# Patient Record
Sex: MALE | Race: WHITE | Employment: FULL TIME | URBAN - METROPOLITAN AREA
[De-identification: names, ages, dates, MRNs, and addresses within clinical notes are randomized per-mention and may not be internally consistent; named-entity substitution may affect disease eponyms.]

---

## 2019-02-08 ENCOUNTER — HOSPITAL ENCOUNTER (OUTPATIENT)
Age: 34
Discharge: HOME | End: 2019-02-10
Payer: COMMERCIAL

## 2019-02-08 PROCEDURE — 87075 CULTR BACTERIA EXCEPT BLOOD: CPT

## 2019-02-08 PROCEDURE — 87070 CULTURE OTHR SPECIMN AEROBIC: CPT

## 2019-02-08 PROCEDURE — 87186 SC STD MICRODIL/AGAR DIL: CPT

## 2019-02-08 PROCEDURE — 87205 SMEAR GRAM STAIN: CPT

## 2019-02-09 LAB — GRAM STAIN ORDERABLE: NORMAL

## 2019-02-10 LAB
ANAEROBIC CULTURE: NORMAL
ORGANISM: ABNORMAL
WOUND/ABSCESS: ABNORMAL
WOUND/ABSCESS: ABNORMAL

## 2022-08-15 ENCOUNTER — HOSPITAL ENCOUNTER (EMERGENCY)
Age: 37
Discharge: HOME | End: 2022-08-15
Attending: EMERGENCY MEDICINE
Payer: COMMERCIAL

## 2022-08-15 ENCOUNTER — APPOINTMENT (OUTPATIENT)
Dept: CT IMAGING | Age: 37
End: 2022-08-15
Payer: COMMERCIAL

## 2022-08-15 VITALS
WEIGHT: 250 LBS | HEIGHT: 73 IN | TEMPERATURE: 97.6 F | DIASTOLIC BLOOD PRESSURE: 79 MMHG | HEART RATE: 85 BPM | SYSTOLIC BLOOD PRESSURE: 134 MMHG | OXYGEN SATURATION: 97 % | BODY MASS INDEX: 33.13 KG/M2 | RESPIRATION RATE: 18 BRPM

## 2022-08-15 DIAGNOSIS — N20.0 KIDNEY STONE: Primary | ICD-10-CM

## 2022-08-15 LAB
ALBUMIN SERPL-MCNC: 4.7 G/DL (ref 3.5–5.2)
ALP BLD-CCNC: 61 U/L (ref 40–129)
ALT SERPL-CCNC: 27 U/L (ref 0–40)
ANION GAP SERPL CALCULATED.3IONS-SCNC: 13 MMOL/L (ref 7–16)
AST SERPL-CCNC: 17 U/L (ref 0–39)
BACTERIA: ABNORMAL /HPF
BASOPHILS ABSOLUTE: 0.04 E9/L (ref 0–0.2)
BASOPHILS RELATIVE PERCENT: 0.5 % (ref 0–2)
BILIRUB SERPL-MCNC: <0.2 MG/DL (ref 0–1.2)
BILIRUBIN URINE: NEGATIVE
BLOOD, URINE: ABNORMAL
BUN BLDV-MCNC: 20 MG/DL (ref 6–20)
CALCIUM SERPL-MCNC: 9.4 MG/DL (ref 8.6–10.2)
CHLORIDE BLD-SCNC: 105 MMOL/L (ref 98–107)
CLARITY: CLEAR
CO2: 24 MMOL/L (ref 22–29)
COLOR: YELLOW
CREAT SERPL-MCNC: 1.3 MG/DL (ref 0.7–1.2)
CRYSTALS, UA: ABNORMAL /HPF
EKG ATRIAL RATE: 85 BPM
EKG P AXIS: 39 DEGREES
EKG P-R INTERVAL: 162 MS
EKG Q-T INTERVAL: 386 MS
EKG QRS DURATION: 102 MS
EKG QTC CALCULATION (BAZETT): 459 MS
EKG R AXIS: 31 DEGREES
EKG T AXIS: 29 DEGREES
EKG VENTRICULAR RATE: 85 BPM
EOSINOPHILS ABSOLUTE: 0.37 E9/L (ref 0.05–0.5)
EOSINOPHILS RELATIVE PERCENT: 4.2 % (ref 0–6)
GFR AFRICAN AMERICAN: >60
GFR NON-AFRICAN AMERICAN: >60 ML/MIN/1.73
GLUCOSE BLD-MCNC: 133 MG/DL (ref 74–99)
GLUCOSE URINE: NEGATIVE MG/DL
HCT VFR BLD CALC: 39.9 % (ref 37–54)
HEMOGLOBIN: 13 G/DL (ref 12.5–16.5)
IMMATURE GRANULOCYTES #: 0.05 E9/L
IMMATURE GRANULOCYTES %: 0.6 % (ref 0–5)
KETONES, URINE: NEGATIVE MG/DL
LEUKOCYTE ESTERASE, URINE: NEGATIVE
LIPASE: 26 U/L (ref 13–60)
LYMPHOCYTES ABSOLUTE: 3.36 E9/L (ref 1.5–4)
LYMPHOCYTES RELATIVE PERCENT: 38.2 % (ref 20–42)
MCH RBC QN AUTO: 28.9 PG (ref 26–35)
MCHC RBC AUTO-ENTMCNC: 32.6 % (ref 32–34.5)
MCV RBC AUTO: 88.7 FL (ref 80–99.9)
MONOCYTES ABSOLUTE: 0.83 E9/L (ref 0.1–0.95)
MONOCYTES RELATIVE PERCENT: 9.4 % (ref 2–12)
NEUTROPHILS ABSOLUTE: 4.15 E9/L (ref 1.8–7.3)
NEUTROPHILS RELATIVE PERCENT: 47.1 % (ref 43–80)
NITRITE, URINE: NEGATIVE
PDW BLD-RTO: 12.8 FL (ref 11.5–15)
PH UA: 5.5 (ref 5–9)
PLATELET # BLD: 276 E9/L (ref 130–450)
PMV BLD AUTO: 9.9 FL (ref 7–12)
POTASSIUM SERPL-SCNC: 3.9 MMOL/L (ref 3.5–5)
PROTEIN UA: NEGATIVE MG/DL
RBC # BLD: 4.5 E12/L (ref 3.8–5.8)
RBC UA: ABNORMAL /HPF (ref 0–2)
SODIUM BLD-SCNC: 142 MMOL/L (ref 132–146)
SPECIFIC GRAVITY UA: >=1.03 (ref 1–1.03)
TOTAL PROTEIN: 7 G/DL (ref 6.4–8.3)
UROBILINOGEN, URINE: 0.2 E.U./DL
WBC # BLD: 8.8 E9/L (ref 4.5–11.5)
WBC UA: ABNORMAL /HPF (ref 0–5)

## 2022-08-15 PROCEDURE — 2580000003 HC RX 258: Performed by: EMERGENCY MEDICINE

## 2022-08-15 PROCEDURE — 81001 URINALYSIS AUTO W/SCOPE: CPT

## 2022-08-15 PROCEDURE — 85025 COMPLETE CBC W/AUTO DIFF WBC: CPT

## 2022-08-15 PROCEDURE — 96375 TX/PRO/DX INJ NEW DRUG ADDON: CPT

## 2022-08-15 PROCEDURE — 83690 ASSAY OF LIPASE: CPT

## 2022-08-15 PROCEDURE — 93005 ELECTROCARDIOGRAM TRACING: CPT | Performed by: EMERGENCY MEDICINE

## 2022-08-15 PROCEDURE — 80053 COMPREHEN METABOLIC PANEL: CPT

## 2022-08-15 PROCEDURE — 99284 EMERGENCY DEPT VISIT MOD MDM: CPT

## 2022-08-15 PROCEDURE — 6360000002 HC RX W HCPCS: Performed by: EMERGENCY MEDICINE

## 2022-08-15 PROCEDURE — 74176 CT ABD & PELVIS W/O CONTRAST: CPT

## 2022-08-15 PROCEDURE — 96374 THER/PROPH/DIAG INJ IV PUSH: CPT

## 2022-08-15 RX ORDER — ONDANSETRON 2 MG/ML
4 INJECTION INTRAMUSCULAR; INTRAVENOUS EVERY 6 HOURS PRN
Status: DISCONTINUED | OUTPATIENT
Start: 2022-08-15 | End: 2022-08-15 | Stop reason: HOSPADM

## 2022-08-15 RX ORDER — 0.9 % SODIUM CHLORIDE 0.9 %
1000 INTRAVENOUS SOLUTION INTRAVENOUS ONCE
Status: COMPLETED | OUTPATIENT
Start: 2022-08-15 | End: 2022-08-15

## 2022-08-15 RX ORDER — DROPERIDOL 2.5 MG/ML
0.62 INJECTION, SOLUTION INTRAMUSCULAR; INTRAVENOUS ONCE
Status: COMPLETED | OUTPATIENT
Start: 2022-08-15 | End: 2022-08-15

## 2022-08-15 RX ORDER — KETOROLAC TROMETHAMINE 30 MG/ML
30 INJECTION, SOLUTION INTRAMUSCULAR; INTRAVENOUS ONCE
Status: COMPLETED | OUTPATIENT
Start: 2022-08-15 | End: 2022-08-15

## 2022-08-15 RX ADMIN — ONDANSETRON HYDROCHLORIDE 4 MG: 2 SOLUTION INTRAMUSCULAR; INTRAVENOUS at 04:00

## 2022-08-15 RX ADMIN — KETOROLAC TROMETHAMINE 30 MG: 30 INJECTION, SOLUTION INTRAMUSCULAR at 03:51

## 2022-08-15 RX ADMIN — SODIUM CHLORIDE 1000 ML: 9 INJECTION, SOLUTION INTRAVENOUS at 03:55

## 2022-08-15 RX ADMIN — DROPERIDOL 0.62 MG: 2.5 INJECTION, SOLUTION INTRAMUSCULAR; INTRAVENOUS at 06:06

## 2022-08-15 ASSESSMENT — PAIN - FUNCTIONAL ASSESSMENT
PAIN_FUNCTIONAL_ASSESSMENT: 0-10
PAIN_FUNCTIONAL_ASSESSMENT: 0-10

## 2022-08-15 ASSESSMENT — PAIN DESCRIPTION - FREQUENCY: FREQUENCY: CONTINUOUS

## 2022-08-15 ASSESSMENT — PAIN DESCRIPTION - DESCRIPTORS: DESCRIPTORS: ACHING;STABBING

## 2022-08-15 ASSESSMENT — PAIN DESCRIPTION - ORIENTATION: ORIENTATION: RIGHT

## 2022-08-15 ASSESSMENT — PAIN SCALES - GENERAL
PAINLEVEL_OUTOF10: 2
PAINLEVEL_OUTOF10: 10
PAINLEVEL_OUTOF10: 10

## 2022-08-15 ASSESSMENT — PAIN DESCRIPTION - LOCATION: LOCATION: ABDOMEN;BACK;FLANK

## 2022-08-15 NOTE — ED PROVIDER NOTES
HPI:  8/15/22, Time: 3:48 AM EDT         Boo Ponce is a 40 y.o. male presenting to the ED for flank pain, beginning hours ago. The complaint has been persistent, moderate in severity, and worsened by nothing. Patient reporting right-sided flank pain going into his right lower abdomen as well as his right testicle. Patient reporting nausea but no vomiting. Patient reporting no chest pain he does report feeling short of breath secondary to the pain. Patient reporting no urinary symptoms. Patient reporting no headache. He reports no cough or fever. ROS:   Pertinent positives and negatives are stated within HPI, all other systems reviewed and are negative.  --------------------------------------------- PAST HISTORY ---------------------------------------------  Past Medical History:  has no past medical history on file. Past Surgical History:  has no past surgical history on file. Social History:  reports that he has never smoked. He has never used smokeless tobacco. He reports that he does not currently use alcohol. Family History: family history is not on file. The patients home medications have been reviewed. Allergies: Patient has no known allergies. ---------------------------------------------------PHYSICAL EXAM--------------------------------------    Constitutional/General: Alert and oriented x3, mild to moderate distress  Head: Normocephalic and atraumatic  Eyes: PERRL, EOMI  Mouth: Oropharynx clear, handling secretions, no trismus  Neck: Supple, full ROM, non tender to palpation in the midline, no stridor, no crepitus, no meningeal signs  Pulmonary: Lungs clear to auscultation bilaterally, no wheezes, rales, or rhonchi. Not in respiratory distress  Cardiovascular:  Regular rate. Regular rhythm. No murmurs, gallops, or rubs. 2+ distal pulses  Chest: no chest wall tenderness  Abdomen: Soft. Tender right lower abdomen as well as right flank region non distended. +BS.   No rebound, guarding, or rigidity. No pulsatile masses appreciated. Patient having no testicular tenderness on exam.  There is no swelling. Musculoskeletal: Moves all extremities x 4. Warm and well perfused, no clubbing, cyanosis, or edema. Capillary refill <3 seconds  Skin: warm and dry. No rashes. Neurologic: GCS 15, CN 2-12 grossly intact, no focal deficits, symmetric strength 5/5 in the upper and lower extremities bilaterally  Psych: Normal Affect    -------------------------------------------------- RESULTS -------------------------------------------------  I have personally reviewed all laboratory and imaging results for this patient. Results are listed below.      LABS:  Results for orders placed or performed during the hospital encounter of 08/15/22   CBC with Auto Differential   Result Value Ref Range    WBC 8.8 4.5 - 11.5 E9/L    RBC 4.50 3.80 - 5.80 E12/L    Hemoglobin 13.0 12.5 - 16.5 g/dL    Hematocrit 39.9 37.0 - 54.0 %    MCV 88.7 80.0 - 99.9 fL    MCH 28.9 26.0 - 35.0 pg    MCHC 32.6 32.0 - 34.5 %    RDW 12.8 11.5 - 15.0 fL    Platelets 778 381 - 767 E9/L    MPV 9.9 7.0 - 12.0 fL    Neutrophils % 47.1 43.0 - 80.0 %    Immature Granulocytes % 0.6 0.0 - 5.0 %    Lymphocytes % 38.2 20.0 - 42.0 %    Monocytes % 9.4 2.0 - 12.0 %    Eosinophils % 4.2 0.0 - 6.0 %    Basophils % 0.5 0.0 - 2.0 %    Neutrophils Absolute 4.15 1.80 - 7.30 E9/L    Immature Granulocytes # 0.05 E9/L    Lymphocytes Absolute 3.36 1.50 - 4.00 E9/L    Monocytes Absolute 0.83 0.10 - 0.95 E9/L    Eosinophils Absolute 0.37 0.05 - 0.50 E9/L    Basophils Absolute 0.04 0.00 - 0.20 E9/L   Comprehensive Metabolic Panel   Result Value Ref Range    Sodium 142 132 - 146 mmol/L    Potassium 3.9 3.5 - 5.0 mmol/L    Chloride 105 98 - 107 mmol/L    CO2 24 22 - 29 mmol/L    Anion Gap 13 7 - 16 mmol/L    Glucose 133 (H) 74 - 99 mg/dL    BUN 20 6 - 20 mg/dL    Creatinine 1.3 (H) 0.7 - 1.2 mg/dL    GFR Non-African American >60 >=60 mL/min/1.73    GFR African American >60     Calcium 9.4 8.6 - 10.2 mg/dL    Total Protein 7.0 6.4 - 8.3 g/dL    Albumin 4.7 3.5 - 5.2 g/dL    Total Bilirubin <0.2 0.0 - 1.2 mg/dL    Alkaline Phosphatase 61 40 - 129 U/L    ALT 27 0 - 40 U/L    AST 17 0 - 39 U/L   Lipase   Result Value Ref Range    Lipase 26 13 - 60 U/L   Urinalysis   Result Value Ref Range    Color, UA Yellow Straw/Yellow    Clarity, UA Clear Clear    Glucose, Ur Negative Negative mg/dL    Bilirubin Urine Negative Negative    Ketones, Urine Negative Negative mg/dL    Specific Gravity, UA >=1.030 1.005 - 1.030    Blood, Urine MODERATE (A) Negative    pH, UA 5.5 5.0 - 9.0    Protein, UA Negative Negative mg/dL    Urobilinogen, Urine 0.2 <2.0 E.U./dL    Nitrite, Urine Negative Negative    Leukocyte Esterase, Urine Negative Negative   Microscopic Urinalysis   Result Value Ref Range    WBC, UA NONE 0 - 5 /HPF    RBC, UA 5-10 (A) 0 - 2 /HPF    Bacteria, UA NONE SEEN None Seen /HPF    Crystals, UA Rare (A) None Seen /HPF   EKG 12 Lead   Result Value Ref Range    Ventricular Rate 85 BPM    Atrial Rate 85 BPM    P-R Interval 162 ms    QRS Duration 102 ms    Q-T Interval 386 ms    QTc Calculation (Bazett) 459 ms    P Axis 39 degrees    R Axis 31 degrees    T Axis 29 degrees       RADIOLOGY:  Interpreted by Radiologist.  CT ABDOMEN PELVIS WO CONTRAST Additional Contrast? None   Preliminary Result   Small amount of scattered gas with multiple small fluid levels in small   bowel. Finding is nonspecific but acute enteritis not excluded. Otherwise, definable acute process in the abdomen or in the pelvis. Normal   appendix visualized. Mild generalized fatty infiltration in the liver with mild hepatomegaly. EKG:  This EKG is signed and interpreted by me.     Rate: 85  Rhythm: Sinus  Interpretation: no acute changes  Comparison: no previous EKG available    ------------------------- NURSING NOTES AND VITALS REVIEWED ---------------------------   The nursing notes within the ED encounter and vital signs as below have been reviewed by myself. /79   Pulse 85   Temp 97.6 °F (36.4 °C) (Oral)   Resp 18   Ht 6' 1\" (1.854 m)   Wt 250 lb (113.4 kg)   SpO2 97%   BMI 32.98 kg/m²   Oxygen Saturation Interpretation: Normal    The patients available past medical records and past encounters were reviewed. ------------------------------ ED COURSE/MEDICAL DECISION MAKING----------------------  Medications   ondansetron (ZOFRAN) injection 4 mg (4 mg IntraVENous Given 8/15/22 0400)   0.9 % sodium chloride bolus (0 mLs IntraVENous Stopped 8/15/22 0545)   ketorolac (TORADOL) injection 30 mg (30 mg IntraVENous Given 8/15/22 0351)   droperidol (INAPSINE) injection 0.625 mg (0.625 mg IntraVENous Given 8/15/22 0606)             Medical Decision Making:    Patient presenting here because of flank pain going down into his right groin into his testicle that started hours prior arrival.  Patient reporting nausea but no vomiting. Patient reporting no fever no chills. Patient reporting some mild shortness of breath secondary to pain. Patient reporting no leg pain or swelling. Patient on exam was in mild to moderate distress patient was medicated Toradol and as well as Zofran. Patient did have significant relief with the pain medication. Patient labs and CT noted reviewed. Patient does have noted blood in microscopically in his urine. Patient CT shows no signs of kidney stones there is no obstruction. Patient does have uric acid noted on urinalysis. Patient did become nauseated again. Patient was medicated with droperidol. Patient significantly improved on recheck abdomen soft nontender he is in no distress. Patient has had no episodes of emesis here patient was made aware of findings and suspected kidney stone although there is no visualized kidney stone on CT.   Patient will be followed by his PCP is to follow-up with urology is to return if symptoms worsen or persist patient and family member are comfortable to plan on recheck he is nontoxic-appearing and having no pain    Re-Evaluations:             Patient rechecked multiple times here in the emergency room. Patient seemingly improved after being medicated with Toradol and Zofran he did have an episode where he was having some increased nausea and some mild upper abdominal pain was 3 medicated with droperidol has complete relief of pain. Patient was made aware of findings and plan. And CT results. Patient was made aware that I do believe he has a had a kidney stone or he may have passed that although was not visualized and there is no hydronephrosis. Patient to follow-up with his PCP he is to return if symptoms worsen or persist his abdomen is soft and nontender on recheck. He is having no testicular pain on recheck      Consultations:                 Critical Care: This patient's ED course included: a personal history and physicial eaxmination    This patient has been closely monitored during their ED course. Counseling: The emergency provider has spoken with the patient and discussed todays results, in addition to providing specific details for the plan of care and counseling regarding the diagnosis and prognosis. Questions are answered at this time and they are agreeable with the plan.       --------------------------------- IMPRESSION AND DISPOSITION ---------------------------------    IMPRESSION  1. Kidney stone        DISPOSITION  Disposition: Discharge to home  Patient condition is stable        NOTE: This report was transcribed using voice recognition software.  Every effort was made to ensure accuracy; however, inadvertent computerized transcription errors may be present          Heath Vilchis MD  08/15/22 5338

## 2025-01-09 ENCOUNTER — HOSPITAL ENCOUNTER (OUTPATIENT)
Age: 40
Discharge: HOME | End: 2025-01-11
Payer: COMMERCIAL

## 2025-01-09 ENCOUNTER — HOSPITAL ENCOUNTER (OUTPATIENT)
Dept: GENERAL RADIOLOGY | Age: 40
Discharge: HOME | End: 2025-01-11
Payer: COMMERCIAL

## 2025-01-09 DIAGNOSIS — M10.9 ARTICULAR GOUT: ICD-10-CM

## 2025-01-09 PROCEDURE — 73630 X-RAY EXAM OF FOOT: CPT

## 2025-06-13 ENCOUNTER — TELEPHONE (OUTPATIENT)
Dept: CARDIOLOGY | Age: 40
End: 2025-06-13

## 2025-07-01 ENCOUNTER — HOSPITAL ENCOUNTER (OUTPATIENT)
Dept: CARDIOLOGY | Age: 40
Discharge: HOME | End: 2025-07-03
Payer: COMMERCIAL

## 2025-07-01 VITALS — HEIGHT: 73 IN | WEIGHT: 250 LBS | BODY MASS INDEX: 33.13 KG/M2

## 2025-07-01 DIAGNOSIS — R00.0 TACHYCARDIA: ICD-10-CM

## 2025-07-01 LAB
ECHO AO ASC DIAM: 2.9 CM
ECHO AO ASCENDING AORTA INDEX: 1.22 CM/M2
ECHO AO SINUS VALSALVA DIAM: 3.2 CM
ECHO AO SINUS VALSALVA INDEX: 1.35 CM/M2
ECHO AV AREA PEAK VELOCITY: 2.5 CM2
ECHO AV AREA VTI: 2.8 CM2
ECHO AV AREA/BSA PEAK VELOCITY: 1.1 CM2/M2
ECHO AV AREA/BSA VTI: 1.2 CM2/M2
ECHO AV CUSP MM: 2.6 CM
ECHO AV MEAN GRADIENT: 3 MMHG
ECHO AV MEAN VELOCITY: 0.9 M/S
ECHO AV PEAK GRADIENT: 6 MMHG
ECHO AV PEAK VELOCITY: 1.3 M/S
ECHO AV VELOCITY RATIO: 0.69
ECHO AV VTI: 22.3 CM
ECHO BSA: 2.42 M2
ECHO EST RA PRESSURE: 3 MMHG
ECHO LA DIAMETER INDEX: 1.6 CM/M2
ECHO LA DIAMETER: 3.8 CM
ECHO LA VOL A-L A2C: 54 ML (ref 18–58)
ECHO LA VOL A-L A4C: 54 ML (ref 18–58)
ECHO LA VOL MOD A2C: 52 ML (ref 18–58)
ECHO LA VOL MOD A4C: 49 ML (ref 18–58)
ECHO LA VOLUME AREA LENGTH: 58 ML
ECHO LA VOLUME INDEX A-L A2C: 23 ML/M2 (ref 16–34)
ECHO LA VOLUME INDEX A-L A4C: 23 ML/M2 (ref 16–34)
ECHO LA VOLUME INDEX AREA LENGTH: 24 ML/M2 (ref 16–34)
ECHO LA VOLUME INDEX MOD A2C: 22 ML/M2 (ref 16–34)
ECHO LA VOLUME INDEX MOD A4C: 21 ML/M2 (ref 16–34)
ECHO LV EF PHYSICIAN: 60 %
ECHO LV FRACTIONAL SHORTENING: 34 % (ref 28–44)
ECHO LV INTERNAL DIMENSION DIASTOLE INDEX: 1.86 CM/M2
ECHO LV INTERNAL DIMENSION DIASTOLIC: 4.4 CM (ref 4.2–5.9)
ECHO LV INTERNAL DIMENSION SYSTOLIC INDEX: 1.22 CM/M2
ECHO LV INTERNAL DIMENSION SYSTOLIC: 2.9 CM
ECHO LV ISOVOLUMETRIC RELAXATION TIME (IVRT): 93.4 MS
ECHO LV IVSD: 1.2 CM (ref 0.6–1)
ECHO LV IVSS: 1.6 CM
ECHO LV MASS 2D: 191.3 G (ref 88–224)
ECHO LV MASS INDEX 2D: 80.7 G/M2 (ref 49–115)
ECHO LV POSTERIOR WALL DIASTOLIC: 1.2 CM (ref 0.6–1)
ECHO LV POSTERIOR WALL SYSTOLIC: 1.3 CM
ECHO LV RELATIVE WALL THICKNESS RATIO: 0.55
ECHO LVOT AREA: 3.1 CM2
ECHO LVOT AV VTI INDEX: 0.86
ECHO LVOT DIAM: 2 CM
ECHO LVOT MEAN GRADIENT: 2 MMHG
ECHO LVOT PEAK GRADIENT: 4 MMHG
ECHO LVOT PEAK VELOCITY: 0.9 M/S
ECHO LVOT STROKE VOLUME INDEX: 25.3 ML/M2
ECHO LVOT SV: 60 ML
ECHO LVOT VTI: 19.1 CM
ECHO MV "A" WAVE DURATION: 145.3 MSEC
ECHO MV A VELOCITY: 0.58 M/S
ECHO MV AREA PHT: 2.9 CM2
ECHO MV AREA VTI: 2.8 CM2
ECHO MV E DECELERATION TIME (DT): 204.6 MS
ECHO MV E VELOCITY: 0.87 M/S
ECHO MV E/A RATIO: 1.5
ECHO MV LVOT VTI INDEX: 1.14
ECHO MV MAX VELOCITY: 0.9 M/S
ECHO MV MEAN GRADIENT: 2 MMHG
ECHO MV MEAN VELOCITY: 0.6 M/S
ECHO MV PEAK GRADIENT: 3 MMHG
ECHO MV PRESSURE HALF TIME (PHT): 76.2 MS
ECHO MV VTI: 21.8 CM
ECHO PV MAX VELOCITY: 1 M/S
ECHO PV MEAN GRADIENT: 2 MMHG
ECHO PV MEAN VELOCITY: 0.7 M/S
ECHO PV PEAK GRADIENT: 4 MMHG
ECHO PV VTI: 18.6 CM
ECHO PVEIN A DURATION: 103.8 MS
ECHO PVEIN A VELOCITY: 0.3 M/S
ECHO PVEIN PEAK D VELOCITY: 0.4 M/S
ECHO PVEIN PEAK S VELOCITY: 0.6 M/S
ECHO PVEIN S/D RATIO: 1.5
ECHO RIGHT VENTRICULAR SYSTOLIC PRESSURE (RVSP): 22 MMHG
ECHO RV INTERNAL DIMENSION: 2.5 CM
ECHO RV TAPSE: 2.8 CM (ref 1.7–?)
ECHO TV REGURGITANT MAX VELOCITY: 2.2 M/S
ECHO TV REGURGITANT PEAK GRADIENT: 19 MMHG

## 2025-07-01 PROCEDURE — 93306 TTE W/DOPPLER COMPLETE: CPT | Performed by: INTERNAL MEDICINE

## 2025-07-01 PROCEDURE — 93306 TTE W/DOPPLER COMPLETE: CPT
